# Patient Record
Sex: MALE | Race: WHITE | URBAN - NONMETROPOLITAN AREA
[De-identification: names, ages, dates, MRNs, and addresses within clinical notes are randomized per-mention and may not be internally consistent; named-entity substitution may affect disease eponyms.]

---

## 2024-01-03 ENCOUNTER — OFFICE VISIT (OUTPATIENT)
Dept: FAMILY MEDICINE CLINIC | Age: 29
End: 2024-01-03
Payer: OTHER GOVERNMENT

## 2024-01-03 VITALS
HEIGHT: 69 IN | SYSTOLIC BLOOD PRESSURE: 128 MMHG | BODY MASS INDEX: 42.65 KG/M2 | OXYGEN SATURATION: 96 % | HEART RATE: 104 BPM | WEIGHT: 288 LBS | DIASTOLIC BLOOD PRESSURE: 90 MMHG

## 2024-01-03 DIAGNOSIS — H61.22 IMPACTED CERUMEN OF LEFT EAR: Primary | ICD-10-CM

## 2024-01-03 PROCEDURE — PBSHW PR REMOVAL IMPACTED CERUMEN IRRIGATION/LVG UNILAT

## 2024-01-03 PROCEDURE — 69209 REMOVE IMPACTED EAR WAX UNI: CPT

## 2024-01-03 PROCEDURE — 99202 OFFICE O/P NEW SF 15 MIN: CPT

## 2024-01-03 RX ORDER — DEXTROAMPHETAMINE SACCHARATE, AMPHETAMINE ASPARTATE MONOHYDRATE, DEXTROAMPHETAMINE SULFATE AND AMPHETAMINE SULFATE 1.25; 1.25; 1.25; 1.25 MG/1; MG/1; MG/1; MG/1
5 CAPSULE, EXTENDED RELEASE ORAL EVERY MORNING
COMMUNITY

## 2024-01-03 RX ORDER — CLONIDINE HYDROCHLORIDE 0.1 MG/1
0.1 TABLET ORAL 2 TIMES DAILY
COMMUNITY

## 2024-01-08 PROBLEM — F41.9 ANXIETY: Status: ACTIVE | Noted: 2024-01-08

## 2024-01-08 PROBLEM — F90.9 ATTENTION DEFICIT HYPERACTIVITY DISORDER (ADHD): Status: ACTIVE | Noted: 2023-12-14

## 2024-01-08 ASSESSMENT — ENCOUNTER SYMPTOMS
SHORTNESS OF BREATH: 0
COLOR CHANGE: 0
COUGH: 0

## 2024-01-08 NOTE — PROGRESS NOTES
[facility]  [address] [address]  Dept: [phone]    Date of Service:  1/3/2024    Ji Haywood is a 28 y.o. male who presents in office today with Self and Spouse.    Chief Complaint   Patient presents with    Otalgia     Pt is here for left ear pain. Pt states he thinks in an ear infection, pt states the pain started on Sunday. Pt has had congestion,cough.         Diagnoses / Plan:   1. Impacted cerumen of left ear  -     Remove impacted ear wax  -     61128 - UT REMOVAL IMPACTED CERUMEN IRRIGATION/LVG UNILAT   -flushed left ear with warm water/hydrogen peroxide 1:1., TM- normal after. Do not use q-tips, no infection noted. Monitor symptoms, let office know if you develop pain, drainage, or any other symptoms.      Encouraged symptomatic treatment, rest, increase oral fluid intake.  Follow-up for worsening or persistent symptoms.  Patient verbalizes understanding regarding plan of care and all questions answered.    No follow-ups on file.     Subjective:   History of Present Illness:  -left ear fullness and pain. Has used ear wax removal tool with some relief, but thinks there is still wax on TM due to hearing is muffled.       Current Outpatient Medications   Medication Sig Dispense Refill    amphetamine-dextroamphetamine (ADDERALL XR) 5 MG extended release capsule Take 1 capsule by mouth every morning. Max Daily Amount: 5 mg      cloNIDine (CATAPRES) 0.1 MG tablet Take 1 tablet by mouth 2 times daily       No current facility-administered medications for this visit.       Review of Systems   Constitutional:  Negative for chills and fatigue.   HENT:  Positive for ear pain. Negative for congestion and ear discharge.    Respiratory:  Negative for cough and shortness of breath.    Cardiovascular:  Negative for chest pain.   Skin:  Negative for color change.               No data to display              Interpretation of Total Score Depression Severity: 1-4 = Minimal

## 2025-06-26 ENCOUNTER — TELEPHONE (OUTPATIENT)
Dept: FAMILY MEDICINE CLINIC | Age: 30
End: 2025-06-26

## 2025-06-26 NOTE — TELEPHONE ENCOUNTER
Patient states he is SOB, has pain in chest and wants to be seen.  Will states for him to go to ER.  Pt verbalized understanding.  He said he did not need EMS or family to take him because he feels okay to drive.  I offered again and he declined.  He did not look in distress.